# Patient Record
Sex: FEMALE | Race: WHITE
[De-identification: names, ages, dates, MRNs, and addresses within clinical notes are randomized per-mention and may not be internally consistent; named-entity substitution may affect disease eponyms.]

---

## 2017-04-19 NOTE — POSTOPPROG
Post Op Note


Date of Operation: 04/19/17


Surgeon: Luis Mullen


Assistant: Nicole Mauricio


Anesthesia: GET(General Endotracheal)


Pre-op Diagnosis: Uterovaginal prolapse, stress incontinence


Post-op Diagnosis: Same


Procedure: Robotic hyst, BSO, sacrocolpopexy, TOT sling, cysto


Findings: Ureters function at end of case.


Inf/Abcess present in the surg proc area at time of surgery?: No


EBL: Minimal


Complications: 





None

## 2017-04-19 NOTE — GOP
[f rep st]



                                                                OPERATIVE REPORT





DATE OF OPERATION:  04/19/2017



SURGEON:  Luis Mullen MD



ASSISTANT:  Nicole Mauricio CFA.



ANESTHESIA:  General.



PREOPERATIVE DIAGNOSIS:  

1.  Cystocele.

2.  Uterine prolapse.

3.  Rectocele.

4.  Stress urinary incontinence.



POSTOPERATIVE DIAGNOSIS:  

1.  Cystocele.

2.  Uterine prolapse.

3.  Rectocele.

4.  Stress urinary incontinence.



PROCEDURE PERFORMED:  

1.  Robotic-assisted laparoscopic hysterectomy, bilateral salpingo-oophorectomy.

2.  Robotic-assisted laparoscopic sacrocervicopexy with mesh.

3.  Repair of cystocele and rectocele.

4.  Transobturator sling.

5.  Cystoscopy.



FINDINGS:  



SPECIMENS:  Uterus, bilateral tubes, and ovaries.



ESTIMATED BLOOD LOSS:  Scant.



DESCRIPTION OF PROCEDURE:  The patient was taken to the operating room where she was identified.  Ge
neral anesthesia was administered and found to be adequate.  She was placed in the lithotomy positio
n and prepared and draped in normal sterile fashion.  A Betancourt catheter was placed in her bladder. 



A 1 cm infraumbilical incision was made with a scalpel.  The Veress needle with the CO2 gas flowing 
was advanced into the peritoneal cavity.  The abdomen was then insufflated with carbon dioxide gas. 
 The 12 mm trocar followed by the laparoscope were then inserted.  The upper abdomen was unremarkabl
e.  Two lateral ports were placed on either side under direct visualization.  She then was placed in
 Trendelenburg position and the da Elaine robot docked on the left side.  The instruments were then b
rought into the abdominal cavity under direct visualization. 



The left round ligament was divided.  The anterior leaf of the broad ligament was then incised to th
e bifurcation of the left common iliac vessels.  A window was created in the posterior leaf to skele
tonize the infundibulopelvic vessels.  They were then cauterized and transected.  The anterior leaf 
of the broad ligament was then incised over the left uterine vessels and across the cervix.  The raghav
dder was dissected off the cervix and upper vagina.  The left uterine vasculature was then cauterize
d and transected.  The exact same procedure was performed on the patient's right side.  The uterus w
as then bivalved to aid in removal through the umbilicus.  The uterus and upper 3/4 of the cervix we
re amputated from the lower 4th of the cervix with the hot john.  The specimen was then placed in 
the right upper quadrant for later removal. 



The Colpo-Probe was then placed in the vagina.  The bladder was further dissected off the anterior v
aginal wall and down to the level of the bladder neck.  The rectovaginal space was then entered and 
the rectum dissected off the posterior vaginal wall down to the level of the perineal body.  Measure
ments were then obtained and the mesh trimmed to size. 



The sigmoid colon was then retracted laterally.  The sacral promontory was identified and the overly
ing peritoneum incised.  The fat pad was gently dissected off the anterior longitudinal ligament.  T
he peritoneal incision was then extended along the right pericolic gutter medial to the right ureter
 and lateral to the sigmoid colon until I met the rectovaginal opening. 



The cervical stump was then closed with a figure-of-eight suture of 0 Monocryl.  The mesh was esteban
t into the abdominal cavity.  Three sutures of 4-0 Milan-Kimani were used to attach the distal posterior
 mesh to the perineal body.  Two additional rows of Milan-Kimani sutures were placed posteriorly.  Three
 rows of Milan-Kimani sutures were placed anteriorly to suture the mesh down to the level of the bladder
 neck and laterally to the paravaginal tissue.  The Colpo-Probe was then removed.  The sacral arm of
 the mesh was placed over the promontory, and the tension adjusted.  I then scrubbed back into the c
ase to examine the vagina.  The tension was further adjusted to resolve the cystocele and rectocele 
without undue tension on the vagina.  Two sutures of 2-0 Milan-Kimani were used to attach the sacral arm
 of the mesh to the anterior longitudinal ligament at the level of the upper sacral 1 body below the
 intervertebral disk space.  The excess mesh was then trimmed.  The pelvis was irrigated with steril
e saline and hemostasis was present.  3-0 V-Loc 90 suture was used to close the peritoneum over the 
entire mesh.  The robot was then undocked.  The specimen removed through the umbilicus.  The fascia 
was closed with 0 Vicryl.  The skin with 4-0 Monocryl and surgical adhesive. 



Attention was then turned to the sling portion of the procedure.  A mid urethral incision was made w
ith a scalpel.  Tunnels were created bilaterally out to the obturator internus muscles.  Skin incisi
ons were made over the obturator notches.  The curved trocar was placed through the left skin incisi
on, redirected around the ischial pubic rami and out through the vaginal incision using a vaginal fi
nger as a guide.  The sling was then attached and brought out along the same course.  The exact same
 procedure was performed on the patient's right side.  Again, no evidence of vaginal injury had occu
rred.  The sling was then adjusted to allow a small mid urethral gap.  The vaginal epithelium was cl
osed with 2-0 Vicryl, the skin with 4-0 Monocryl. 



Cystoscopy was then performed.  Both ureters had vigorous jets of urine.  There was no evidence of i
njury, suture nor mesh within the bladder nor urethra.  No obvious pathology was seen.  Vaginal pack
ing was then placed.  Anesthesia was reversed and the patient taken to the PACU awake and in stable 
condition.



COMPLICATIONS:  None.



DISPOSITION:  Patient stable to PACU.





Job #:  [id number]

## 2017-12-14 ENCOUNTER — HOSPITAL ENCOUNTER (OUTPATIENT)
Dept: HOSPITAL 80 - BHFA | Age: 67
End: 2017-12-14
Attending: INTERNAL MEDICINE
Payer: COMMERCIAL

## 2017-12-14 DIAGNOSIS — I27.20: Primary | ICD-10-CM

## 2018-01-11 ENCOUNTER — HOSPITAL ENCOUNTER (OUTPATIENT)
Dept: HOSPITAL 80 - FIMAGING | Age: 68
End: 2018-01-11
Attending: OBSTETRICS & GYNECOLOGY
Payer: COMMERCIAL

## 2018-01-11 DIAGNOSIS — R92.8: Primary | ICD-10-CM
